# Patient Record
Sex: FEMALE | Race: WHITE
[De-identification: names, ages, dates, MRNs, and addresses within clinical notes are randomized per-mention and may not be internally consistent; named-entity substitution may affect disease eponyms.]

---

## 2022-06-07 ENCOUNTER — HOSPITAL ENCOUNTER (OUTPATIENT)
Dept: HOSPITAL 75 - RAD FS | Age: 57
End: 2022-06-07
Attending: NURSE PRACTITIONER
Payer: COMMERCIAL

## 2022-06-07 DIAGNOSIS — Z98.890: ICD-10-CM

## 2022-06-07 DIAGNOSIS — M48.56XA: Primary | ICD-10-CM

## 2022-06-07 PROCEDURE — 72100 X-RAY EXAM L-S SPINE 2/3 VWS: CPT

## 2022-06-07 NOTE — DIAGNOSTIC IMAGING REPORT
INDICATION: Low back pain.



TIME OF EXAM: 11:29 AM



FINDINGS: 3 views lumbar spine demonstrate normal curvature and

alignment. There is a superior endplate compression fracture of

L2 vertebral body, age indeterminate. No definite retropulsion is

seen. There is degenerative disc disease at the L1-L2 level with

disc space narrowing and marginal spurring. A screw transfixes

the right SI joint.



IMPRESSION:

1. Postop changes right SI joint.

2. Age-indeterminate superior endplate compression fracture of

L2. If there is concern for acuity, then MRI could be performed

for further evaluation.



Dictated by: 



  Dictated on workstation # [id number]